# Patient Record
Sex: FEMALE | Race: WHITE | NOT HISPANIC OR LATINO | URBAN - METROPOLITAN AREA
[De-identification: names, ages, dates, MRNs, and addresses within clinical notes are randomized per-mention and may not be internally consistent; named-entity substitution may affect disease eponyms.]

---

## 2018-06-30 ENCOUNTER — EMERGENCY (EMERGENCY)
Facility: HOSPITAL | Age: 83
LOS: 1 days | Discharge: HOME | End: 2018-06-30
Attending: EMERGENCY MEDICINE | Admitting: EMERGENCY MEDICINE
Payer: MEDICARE

## 2018-06-30 VITALS
HEART RATE: 99 BPM | DIASTOLIC BLOOD PRESSURE: 88 MMHG | SYSTOLIC BLOOD PRESSURE: 185 MMHG | OXYGEN SATURATION: 96 % | RESPIRATION RATE: 18 BRPM | TEMPERATURE: 98 F

## 2018-06-30 DIAGNOSIS — Z96.649 PRESENCE OF UNSPECIFIED ARTIFICIAL HIP JOINT: Chronic | ICD-10-CM

## 2018-06-30 DIAGNOSIS — R04.0 EPISTAXIS: ICD-10-CM

## 2018-06-30 DIAGNOSIS — I25.10 ATHEROSCLEROTIC HEART DISEASE OF NATIVE CORONARY ARTERY WITHOUT ANGINA PECTORIS: ICD-10-CM

## 2018-06-30 PROCEDURE — 30903 CONTROL OF NOSEBLEED: CPT

## 2018-06-30 PROCEDURE — 99282 EMERGENCY DEPT VISIT SF MDM: CPT | Mod: 25

## 2018-06-30 NOTE — ED PROCEDURE NOTE - CPROC ED NASAL DETAIL1
The source of the bleeding was clearly identified./The area was cauterized with silver nitrate./The bleeding stopped. The source of the bleeding was clearly identified./The anatomic location was packed./The bleeding stopped./The area was cauterized with silver nitrate.

## 2018-06-30 NOTE — ED PROVIDER NOTE - MEDICAL DECISION MAKING DETAILS
Dried blood at medial nose, no active bleeding. Patient requesting cauterized. Will cauterized and monitor.

## 2018-06-30 NOTE — ED PROVIDER NOTE - OBJECTIVE STATEMENT
86 y/o F w/ PMH CAD, on Eliquis, BIB EMS from [facility] accompanied by son who presents w/ nosebleed to the R nare that began 2 hours ago. On arrival here, the nosebleed has resolved without recurrence. Son states she will follow up with her cardiologist tomorrow to have dose of Eliquis decreased from 5 mg to 2.5 mg, and states they believe this is the cause of the nose bleed. Son and patient are asking to cauterized the source of bleeding. Denies trauma/injury, fever, chills, chest pain, shortness of breath, vomiting, bleeding, or any other complaints.

## 2018-06-30 NOTE — ED ADULT NURSE NOTE - OBJECTIVE STATEMENT
patient comes in for Epistaxis, that has resolved. states newly on Eliquis and received patient with a nasal packing, done by Dr Hamilton, patient tolerated.

## 2018-07-01 ENCOUNTER — EMERGENCY (EMERGENCY)
Facility: HOSPITAL | Age: 83
LOS: 1 days | Discharge: HOME | End: 2018-07-01
Attending: EMERGENCY MEDICINE | Admitting: EMERGENCY MEDICINE
Payer: MEDICARE

## 2018-07-01 VITALS
TEMPERATURE: 99 F | SYSTOLIC BLOOD PRESSURE: 134 MMHG | RESPIRATION RATE: 18 BRPM | HEART RATE: 99 BPM | WEIGHT: 158.95 LBS | OXYGEN SATURATION: 97 % | DIASTOLIC BLOOD PRESSURE: 86 MMHG

## 2018-07-01 VITALS
OXYGEN SATURATION: 99 % | HEART RATE: 92 BPM | TEMPERATURE: 98 F | SYSTOLIC BLOOD PRESSURE: 110 MMHG | DIASTOLIC BLOOD PRESSURE: 72 MMHG | RESPIRATION RATE: 18 BRPM

## 2018-07-01 DIAGNOSIS — Z96.649 PRESENCE OF UNSPECIFIED ARTIFICIAL HIP JOINT: Chronic | ICD-10-CM

## 2018-07-01 DIAGNOSIS — Z98.890 OTHER SPECIFIED POSTPROCEDURAL STATES: Chronic | ICD-10-CM

## 2018-07-01 LAB
ANION GAP SERPL CALC-SCNC: 10 MMOL/L — SIGNIFICANT CHANGE UP (ref 5–17)
APTT BLD: 32 SEC — SIGNIFICANT CHANGE UP (ref 27.5–37.4)
BASOPHILS NFR BLD AUTO: 1.1 % — SIGNIFICANT CHANGE UP (ref 0–2)
BUN SERPL-MCNC: 45 MG/DL — HIGH (ref 7–23)
CALCIUM SERPL-MCNC: 9.1 MG/DL — SIGNIFICANT CHANGE UP (ref 8.4–10.5)
CHLORIDE SERPL-SCNC: 103 MMOL/L — SIGNIFICANT CHANGE UP (ref 96–108)
CO2 SERPL-SCNC: 30 MMOL/L — SIGNIFICANT CHANGE UP (ref 22–31)
CREAT SERPL-MCNC: 0.8 MG/DL — SIGNIFICANT CHANGE UP (ref 0.5–1.3)
EOSINOPHIL NFR BLD AUTO: 0.9 % — SIGNIFICANT CHANGE UP (ref 0–6)
GLUCOSE SERPL-MCNC: 103 MG/DL — HIGH (ref 70–99)
HCT VFR BLD CALC: 29.5 % — LOW (ref 34.5–45)
HGB BLD-MCNC: 9.1 G/DL — LOW (ref 11.5–15.5)
INR BLD: 1.5 — HIGH (ref 0.88–1.16)
LYMPHOCYTES # BLD AUTO: 14.2 % — SIGNIFICANT CHANGE UP (ref 13–44)
MCHC RBC-ENTMCNC: 30.8 G/DL — LOW (ref 32–36)
MCHC RBC-ENTMCNC: 31.9 PG — SIGNIFICANT CHANGE UP (ref 27–34)
MCV RBC AUTO: 103.5 FL — HIGH (ref 80–100)
MONOCYTES NFR BLD AUTO: 8 % — SIGNIFICANT CHANGE UP (ref 2–14)
NEUTROPHILS NFR BLD AUTO: 75.8 % — SIGNIFICANT CHANGE UP (ref 43–77)
PLATELET # BLD AUTO: 415 K/UL — HIGH (ref 150–400)
POTASSIUM SERPL-MCNC: 3.9 MMOL/L — SIGNIFICANT CHANGE UP (ref 3.5–5.3)
POTASSIUM SERPL-SCNC: 3.9 MMOL/L — SIGNIFICANT CHANGE UP (ref 3.5–5.3)
PROTHROM AB SERPL-ACNC: 16.8 SEC — HIGH (ref 9.8–12.7)
RBC # BLD: 2.85 M/UL — LOW (ref 3.8–5.2)
RBC # FLD: 14.9 % — SIGNIFICANT CHANGE UP (ref 10.3–16.9)
SODIUM SERPL-SCNC: 143 MMOL/L — SIGNIFICANT CHANGE UP (ref 135–145)
WBC # BLD: 15.9 K/UL — HIGH (ref 3.8–10.5)
WBC # FLD AUTO: 15.9 K/UL — HIGH (ref 3.8–10.5)

## 2018-07-01 PROCEDURE — 99284 EMERGENCY DEPT VISIT MOD MDM: CPT

## 2018-07-01 PROCEDURE — 80048 BASIC METABOLIC PNL TOTAL CA: CPT

## 2018-07-01 PROCEDURE — 85730 THROMBOPLASTIN TIME PARTIAL: CPT

## 2018-07-01 PROCEDURE — 85025 COMPLETE CBC W/AUTO DIFF WBC: CPT

## 2018-07-01 PROCEDURE — 85610 PROTHROMBIN TIME: CPT

## 2018-07-01 PROCEDURE — 36415 COLL VENOUS BLD VENIPUNCTURE: CPT

## 2018-07-01 RX ORDER — APIXABAN 2.5 MG/1
0 TABLET, FILM COATED ORAL
Qty: 0 | Refills: 0 | COMMUNITY

## 2018-07-01 RX ORDER — OXYMETAZOLINE HYDROCHLORIDE 0.5 MG/ML
2 SPRAY NASAL ONCE
Qty: 0 | Refills: 0 | Status: COMPLETED | OUTPATIENT
Start: 2018-07-01 | End: 2018-07-01

## 2018-07-01 RX ORDER — AMOXICILLIN 250 MG/5ML
1 SUSPENSION, RECONSTITUTED, ORAL (ML) ORAL
Qty: 30 | Refills: 0 | OUTPATIENT
Start: 2018-07-01 | End: 2018-07-10

## 2018-07-01 RX ORDER — OXYMETAZOLINE HYDROCHLORIDE 0.5 MG/ML
2 SPRAY NASAL
Qty: 1 | Refills: 0 | OUTPATIENT
Start: 2018-07-01 | End: 2018-07-05

## 2018-07-01 RX ORDER — AMOXICILLIN 250 MG/5ML
500 SUSPENSION, RECONSTITUTED, ORAL (ML) ORAL ONCE
Qty: 0 | Refills: 0 | Status: COMPLETED | OUTPATIENT
Start: 2018-07-01 | End: 2018-07-01

## 2018-07-01 RX ORDER — LIDOCAINE 4 G/100G
1 CREAM TOPICAL ONCE
Qty: 0 | Refills: 0 | Status: COMPLETED | OUTPATIENT
Start: 2018-07-01 | End: 2018-07-01

## 2018-07-01 RX ORDER — LIDOCAINE 4 G/100G
1 CREAM TOPICAL ONCE
Qty: 0 | Refills: 0 | Status: DISCONTINUED | OUTPATIENT
Start: 2018-07-01 | End: 2018-07-01

## 2018-07-01 RX ADMIN — Medication 500 MILLIGRAM(S): at 10:12

## 2018-07-01 RX ADMIN — LIDOCAINE 1 APPLICATION(S): 4 CREAM TOPICAL at 08:11

## 2018-07-01 RX ADMIN — OXYMETAZOLINE HYDROCHLORIDE 2 SPRAY(S): 0.5 SPRAY NASAL at 08:14

## 2018-07-01 NOTE — ED ADULT NURSE NOTE - CHPI ED SYMPTOMS NEG
no weakness/no syncope/no blurred vision/no numbness/no fever/no change in level of consciousness/no vomiting/no loss of consciousness/no nausea/no chills

## 2018-07-01 NOTE — ED PROVIDER NOTE - MEDICAL DECISION MAKING DETAILS
epistaxis. VSS. rhino rocket in place with bleeding around rhino rocket. labs noted. pt evaluated in ED by ENT and packing replaced. bleeding resolved. recommend amoxicillin, afrin and f/u ni 3 days for removal.

## 2018-07-01 NOTE — ED ADULT TRIAGE NOTE - CHIEF COMPLAINT QUOTE
[facility] [place] for nose bleed had rhino rocket placed to right nostril at [facility] yesterday told to return if bleeding again. Rhino rocket in tact with blood noted not dripped pt on eliquis

## 2018-07-01 NOTE — ED ADULT NURSE REASSESSMENT NOTE - NS ED NURSE REASSESS COMMENT FT1
pt in no acute distress. safety maintained. family member at bedside. pending ENT to evaluate the pt.

## 2018-07-01 NOTE — ED PROVIDER NOTE - ATTENDING CONTRIBUTION TO CARE
patient in ED with recurrent epistaxis.  Patient was seen at [facility] for epistaxis yesterday, packing was placed and patient discharged.  She presents to ED with chief complaint bleeding around packing this morning.  Upon my face to face ED eval of patient she has small amount of oozing blood around packing material to right nares.  + Blood to posterior oropharynx.  Heart with regular rate and rhythm, lungs CTA, bilaterally.  Abd soft.  ENT in ED to evaluate patient, will disposition accordingly.

## 2018-07-01 NOTE — ED PROVIDER NOTE - ENMT, MLM
Airway patent,+ rhino rocket in place to right nare. + clot present at anterior portion of rhino rocket. no blood in poster pharynx. Mouth with normal mucosa. Throat has no vesicles, no oropharyngeal exudates and uvula is midline.

## 2018-07-01 NOTE — ED ADULT NURSE NOTE - CHIEF COMPLAINT QUOTE
[facility] [place] [facility] bleed had rhino rocket placed to right nostril at [facility] yesterday told to return if bleeding again. Rhino rocket in tact with blood noted not dripped pt on eliquis

## 2018-07-01 NOTE — ED PROVIDER NOTE - OBJECTIVE STATEMENT
86 y/o female with hx of heart valve repair 1 yr ago on eloquis, HTN and thrombocytopenia c/o epistaxis. Pt states bleeding began last night at dinner and pt seen at [facility]. pt notes awoke this am with bleeding again. no trauma. no fever or chills. no ha or dizziness. no recent change in medications. no further complaints. 86 y/o female with hx of heart valve repair 1 yr ago on eloquis, HTN and thrombocytosis c/o epistaxis. Pt states bleeding began last night at dinner and pt seen at [facility] and [facility]. pt notes awoke this am with bleeding again. no trauma. no fever or chills. no ha or dizziness. no recent change in medications. no further complaints.

## 2018-07-01 NOTE — CONSULT NOTE ADULT - SUBJECTIVE AND OBJECTIVE BOX
HPI: 84 y/o female with hx of heart valve repair 1 yr ago on eloquis, HTN and thrombocytosis c/o epistaxis. Pt states bleeding began last night at dinner and pt seen at [facility] and rhino rocket placed. pt notes awoke this am with bleeding again. no trauma. no fever or chills. no ha or dizziness. no recent change in medications. no further complaints.    Allergies    No Known Allergies    Intolerances        PAST MEDICAL & SURGICAL HISTORY:  Thrombocytosis  HTN (hypertension)  Afib  S/P heart valve repair  History of hip replacement      SOCIAL HISTORY:  Tobacco History:  ETOH Use:   Drug Use:     FAMILY HISTORY:      REVIEW OF SYSTEMS    General:	    HEENT:	  -Neck:  -Ear:   -Mouth and Throat:  -Head/Face:  -Eyes:     Neurologic:  -Cranial Nerves:   	  Allergic/Immunologic:  Respiratory:  Cardiovascular:  Hematology/Lymphatics:	  Endocrine:	      MEDICATIONS:  Antiinfectives:       Hematologic/Anticoagulation:      Pain medications/Neuro:      IV fluids:      Endocrine Medications:       All other standing medications:       All other PRN medications:      Vital Signs Last 24 Hrs  T(C): 36.8 (01 Jul 2018 10:29), Max: 37 (01 Jul 2018 07:09)  T(F): 98.2 (01 Jul 2018 10:29), Max: 98.6 (01 Jul 2018 07:09)  HR: 92 (01 Jul 2018 10:29) (92 - 99)  BP: 110/72 (01 Jul 2018 10:29) (110/72 - 185/88)  BP(mean): --  RR: 18 (01 Jul 2018 10:29) (17 - 18)  SpO2: 99% (01 Jul 2018 10:29) (96% - 99%)    LABS:  CBC-    07-01    143  |  103  |  45<H>  ----------------------------<  103<H>  3.9   |  30  |  0.80    Ca    9.1      01 Jul 2018 07:34      Coagulation Studies-  PT/INR - ( 01 Jul 2018 07:34 )   PT: 16.8 sec;   INR: 1.50          PTT - ( 01 Jul 2018 07:34 )  PTT:32.0 sec  Endocrine Panel-  --  --  9.1 mg/dL        PHYSICAL EXAM:    ENT EXAM-   NAD, AO x3  Nose: oozing from the R nares with partial rhino rocket extruding, packing removed. copious clots suctioned out from the nasal cavity, afrin spray, generalized bleeding noted from the R septum, 8cm merocel placed, no further bleeding noted  OC/OP: posterior OP clear  Neck: supple      Nasal Endoscopy Findings:  -use additional template under ENT [facility] Nasal Endoscopy Exam    Laryngoscopy Findings:   -use additional template under ENT [facility] Laryngoscopy Exam    RADIOLOGY & ADDITIONAL STUDIES:      Assessment/Plan:  85y Female on Eliquis for cardiac issues presenting with R epistaxis  - R nasal cavity packed with merocel  - Antibiotics while packing in  - Afrin x3days for R nose  - sinonasal precautions advised  - pt told that packing to be removed in 4-5 days; pt to follow-up with ENT at [facility] for packing removal  - care/dispo as per ED  - call/page ENT for questions/concerns HPI: 84 y/o female with hx of heart valve repair 1 yr ago on eloquis, HTN and thrombocytosis c/o epistaxis. Pt states bleeding began last night at dinner and pt seen at [facility] and rhino rocket placed. pt notes awoke this am with bleeding again. no trauma. no fever or chills. no ha or dizziness. no recent change in medications. no further complaints.    Allergies    No Known Allergies    Intolerances        PAST MEDICAL & SURGICAL HISTORY:  Thrombocytosis  HTN (hypertension)  Afib  S/P heart valve repair  History of hip replacement      SOCIAL HISTORY:  Tobacco History:  ETOH Use:   Drug Use:     FAMILY HISTORY:      REVIEW OF SYSTEMS    General:	    HEENT:	  -Neck:  -Ear:   -Mouth and Throat:  -Head/Face:  -Eyes:     Neurologic:  -Cranial Nerves:   	  Allergic/Immunologic:  Respiratory:  Cardiovascular:  Hematology/Lymphatics:	  Endocrine:	      MEDICATIONS:  Antiinfectives:       Hematologic/Anticoagulation:      Pain medications/Neuro:      IV fluids:      Endocrine Medications:       All other standing medications:       All other PRN medications:      Vital Signs Last 24 Hrs  T(C): 36.8 (01 Jul 2018 10:29), Max: 37 (01 Jul 2018 07:09)  T(F): 98.2 (01 Jul 2018 10:29), Max: 98.6 (01 Jul 2018 07:09)  HR: 92 (01 Jul 2018 10:29) (92 - 99)  BP: 110/72 (01 Jul 2018 10:29) (110/72 - 185/88)  BP(mean): --  RR: 18 (01 Jul 2018 10:29) (17 - 18)  SpO2: 99% (01 Jul 2018 10:29) (96% - 99%)    LABS:  CBC-    07-01    143  |  103  |  45<H>  ----------------------------<  103<H>  3.9   |  30  |  0.80    Ca    9.1      01 Jul 2018 07:34      Coagulation Studies-  PT/INR - ( 01 Jul 2018 07:34 )   PT: 16.8 sec;   INR: 1.50          PTT - ( 01 Jul 2018 07:34 )  PTT:32.0 sec  Endocrine Panel-  --  --  9.1 mg/dL        PHYSICAL EXAM:    ENT EXAM-   NAD, AO x3  Nose: oozing from the R nares with partial rhino rocket extruding, packing removed. copious clots suctioned out from the nasal cavity, afrin spray, generalized bleeding noted from the R septum, 8cm merocel placed, no further bleeding noted  OC/OP: posterior OP clear  Neck: supple      Nasal Endoscopy Findings:  -use additional template under ENT [facility] Nasal Endoscopy Exam    Laryngoscopy Findings:   -use additional template under ENT [facility] Laryngoscopy Exam    RADIOLOGY & ADDITIONAL STUDIES:      Assessment/Plan:  85y Female on Eliquis for cardiac issues presenting with R epistaxis  - R nasal cavity packed with merocel  - Antibiotics while packing in  - Afrin x3days for R nose  - sinonasal precautions advised  - pt told that packing to be removed in 4-5 days; pt to follow-up with ENT at [facility] for packing removal  - care/dispo as per ED  - call/page ENT for questions/concerns    Communicated with Dr. Mckeon covering for Dr. Banks

## 2018-07-05 ENCOUNTER — EMERGENCY (EMERGENCY)
Facility: HOSPITAL | Age: 83
LOS: 1 days | Discharge: HOME | End: 2018-07-05
Attending: EMERGENCY MEDICINE | Admitting: EMERGENCY MEDICINE
Payer: MEDICARE

## 2018-07-05 VITALS
WEIGHT: 151.9 LBS | RESPIRATION RATE: 95 BRPM | HEART RATE: 92 BPM | SYSTOLIC BLOOD PRESSURE: 115 MMHG | TEMPERATURE: 98 F | DIASTOLIC BLOOD PRESSURE: 78 MMHG

## 2018-07-05 DIAGNOSIS — R04.0 EPISTAXIS: ICD-10-CM

## 2018-07-05 DIAGNOSIS — I10 ESSENTIAL (PRIMARY) HYPERTENSION: ICD-10-CM

## 2018-07-05 DIAGNOSIS — Z79.899 OTHER LONG TERM (CURRENT) DRUG THERAPY: ICD-10-CM

## 2018-07-05 DIAGNOSIS — Z98.890 OTHER SPECIFIED POSTPROCEDURAL STATES: Chronic | ICD-10-CM

## 2018-07-05 DIAGNOSIS — Z79.02 LONG TERM (CURRENT) USE OF ANTITHROMBOTICS/ANTIPLATELETS: ICD-10-CM

## 2018-07-05 DIAGNOSIS — Z96.649 PRESENCE OF UNSPECIFIED ARTIFICIAL HIP JOINT: Chronic | ICD-10-CM

## 2018-07-05 DIAGNOSIS — Z48.00 ENCOUNTER FOR CHANGE OR REMOVAL OF NONSURGICAL WOUND DRESSING: ICD-10-CM

## 2018-07-05 DIAGNOSIS — Z79.2 LONG TERM (CURRENT) USE OF ANTIBIOTICS: ICD-10-CM

## 2018-07-05 PROCEDURE — 99284 EMERGENCY DEPT VISIT MOD MDM: CPT | Mod: 25

## 2018-07-05 PROCEDURE — 99282 EMERGENCY DEPT VISIT SF MDM: CPT

## 2018-07-05 NOTE — CONSULT NOTE ADULT - SUBJECTIVE AND OBJECTIVE BOX
ENT Consult Note    HPI: 85F hx AFib on eliquis in ED for packing removal. Patient was last seen 7/1 in the ED for epistaxis, 8inch merocel placed at that time with instructions to f/u as outpt in 4-5 days for packing removal. She has had occasional mucous dripping from anterior nares but has not had any active bleeding or oozing. Per patient, they were unable to get an appointment as an outpatient so returned to the ED for packing removal.       PE  Alert, NAD  Nares: Merocel in R naris, taped to cheek, no dripping or oozing, no active bleeding. L naris wnl  OC/OPx: Clear, no oozing or active bleeding on OPx  Nonlabored respirations RA  NAVARRO    Merocel moistened with normal saline, gently removed from R naris. Patient tolerated well, no active bleeding. Minor mucous/old blood dripping from naris, expected.       A/P: 85F AFib on eliquis with epistaxis 7/1, now s/p packing removal.  - Avoid digital trauma/instrumentation in nares  - Nasal saline spray BID  - Bacitracin to anterior nares QHS x10d  - F/u Dr. Lauren 1 week

## 2018-07-05 NOTE — ED PROVIDER NOTE - MEDICAL DECISION MAKING DETAILS
86 y/o female with right nasal packing placed by ENT on July 1st and started on abx for continued epistaxis. Pt was advised to fu at the clinic however was unable to obtain appointment. Denies epistaxis, HA, discharge, fever, chills. ENT consulted - removed. Pt was dc'ed by ENT, however left prior to documents.

## 2018-07-05 NOTE — ED PROVIDER NOTE - OBJECTIVE STATEMENT
84 y/o female with a PMHx of Afib (Eliquis) is present for packing removal in her right nostril. Pt states she had spontaneous bleeding 4 days ago and was placed on abx and advised to follow up at the clinical for removal, however was unable to obtain an appointment. Pt reports no nasal pain and states only a small amount of clear fluid that dripped from her nose. She also denies: fever, chills, HA, dizziness, epistaxis.

## 2019-08-30 PROBLEM — I10 ESSENTIAL (PRIMARY) HYPERTENSION: Chronic | Status: ACTIVE | Noted: 2018-07-01

## 2019-08-30 PROBLEM — D47.3 ESSENTIAL (HEMORRHAGIC) THROMBOCYTHEMIA: Chronic | Status: ACTIVE | Noted: 2018-07-01

## 2019-08-30 PROBLEM — I48.91 UNSPECIFIED ATRIAL FIBRILLATION: Chronic | Status: ACTIVE | Noted: 2018-07-01

## 2019-09-27 ENCOUNTER — APPOINTMENT (OUTPATIENT)
Dept: INTERNAL MEDICINE | Facility: CLINIC | Age: 84
End: 2019-09-27

## 2020-01-15 ENCOUNTER — LABORATORY RESULT (OUTPATIENT)
Age: 85
End: 2020-01-15

## 2020-01-15 ENCOUNTER — APPOINTMENT (OUTPATIENT)
Dept: INTERNAL MEDICINE | Facility: CLINIC | Age: 85
End: 2020-01-15
Payer: MEDICARE

## 2020-01-15 VITALS
HEART RATE: 83 BPM | SYSTOLIC BLOOD PRESSURE: 179 MMHG | HEIGHT: 61.5 IN | DIASTOLIC BLOOD PRESSURE: 90 MMHG | WEIGHT: 134 LBS | BODY MASS INDEX: 24.98 KG/M2

## 2020-01-15 DIAGNOSIS — Z87.74 PERSONAL HISTORY OF (CORRECTED) CONGENITAL MALFORMATIONS OF HEART AND CIRCULATORY SYSTEM: ICD-10-CM

## 2020-01-15 DIAGNOSIS — I48.91 UNSPECIFIED ATRIAL FIBRILLATION: ICD-10-CM

## 2020-01-15 DIAGNOSIS — E55.9 VITAMIN D DEFICIENCY, UNSPECIFIED: ICD-10-CM

## 2020-01-15 DIAGNOSIS — Z78.9 OTHER SPECIFIED HEALTH STATUS: ICD-10-CM

## 2020-01-15 DIAGNOSIS — I05.9 RHEUMATIC MITRAL VALVE DISEASE, UNSPECIFIED: ICD-10-CM

## 2020-01-15 DIAGNOSIS — E53.8 DEFICIENCY OF OTHER SPECIFIED B GROUP VITAMINS: ICD-10-CM

## 2020-01-15 DIAGNOSIS — D47.3 ESSENTIAL (HEMORRHAGIC) THROMBOCYTHEMIA: ICD-10-CM

## 2020-01-15 DIAGNOSIS — R60.0 LOCALIZED EDEMA: ICD-10-CM

## 2020-01-15 DIAGNOSIS — Z00.00 ENCOUNTER FOR GENERAL ADULT MEDICAL EXAMINATION W/OUT ABNORMAL FINDINGS: ICD-10-CM

## 2020-01-15 DIAGNOSIS — Z98.890 OTHER SPECIFIED POSTPROCEDURAL STATES: ICD-10-CM

## 2020-01-15 DIAGNOSIS — E03.9 HYPOTHYROIDISM, UNSPECIFIED: ICD-10-CM

## 2020-01-15 DIAGNOSIS — S72.001A FRACTURE OF UNSPECIFIED PART OF NECK OF RIGHT FEMUR, INITIAL ENCOUNTER FOR CLOSED FRACTURE: ICD-10-CM

## 2020-01-15 DIAGNOSIS — I10 ESSENTIAL (PRIMARY) HYPERTENSION: ICD-10-CM

## 2020-01-15 DIAGNOSIS — E78.5 HYPERLIPIDEMIA, UNSPECIFIED: ICD-10-CM

## 2020-01-15 DIAGNOSIS — L88 PYODERMA GANGRENOSUM: ICD-10-CM

## 2020-01-15 PROCEDURE — 36415 COLL VENOUS BLD VENIPUNCTURE: CPT

## 2020-01-15 PROCEDURE — G0439: CPT

## 2020-01-15 RX ORDER — LEVOTHYROXINE SODIUM 0.17 MG/1
TABLET ORAL
Refills: 0 | Status: ACTIVE | COMMUNITY

## 2020-01-15 RX ORDER — HYDROXYUREA 500 MG/1
500 CAPSULE ORAL DAILY
Refills: 0 | Status: ACTIVE | COMMUNITY

## 2020-01-15 RX ORDER — METOPROLOL SUCCINATE 50 MG/1
50 TABLET, EXTENDED RELEASE ORAL DAILY
Refills: 0 | Status: ACTIVE | COMMUNITY

## 2020-01-15 RX ORDER — LOSARTAN POTASSIUM 50 MG/1
50 TABLET, FILM COATED ORAL DAILY
Refills: 0 | Status: ACTIVE | COMMUNITY

## 2020-01-15 RX ORDER — ATORVASTATIN CALCIUM 10 MG/1
10 TABLET, FILM COATED ORAL DAILY
Refills: 0 | Status: ACTIVE | COMMUNITY

## 2020-01-15 RX ORDER — APIXABAN 2.5 MG/1
2.5 TABLET, FILM COATED ORAL
Refills: 0 | Status: ACTIVE | COMMUNITY

## 2020-01-15 RX ORDER — FUROSEMIDE 40 MG/1
40 TABLET ORAL
Refills: 0 | Status: ACTIVE | COMMUNITY

## 2020-01-15 NOTE — HISTORY OF PRESENT ILLNESS
[de-identified] : patient known to me from previous practice \par 87 y/o female with h/o htn, thrombocythemia, afib on eliquis presents with son for follow-up.\par \par of note, patient recently had revision of right hip due to fall last year.  completed PT and is walking well with rolling walker.  no pain \par

## 2020-01-15 NOTE — PHYSICAL EXAM
[Normal Rate] : normal rate  [Normal] : no respiratory distress, lungs were clear to auscultation bilaterally and no accessory muscle use [Regular Rhythm] : with a regular rhythm [Pedal Pulses Present] : the pedal pulses are present [No Edema] : there was no peripheral edema [Soft] : abdomen soft [Non Tender] : non-tender [Non-distended] : non-distended [Normal Posterior Cervical Nodes] : no posterior cervical lymphadenopathy [Normal Anterior Cervical Nodes] : no anterior cervical lymphadenopathy [No Spinal Tenderness] : no spinal tenderness [No CVA Tenderness] : no CVA  tenderness [No Rash] : no rash [Coordination Grossly Intact] : coordination grossly intact [Normal Affect] : the affect was normal [Normal Mood] : the mood was normal [de-identified] : irregular rhythm.  +systolic murmur [de-identified] : seborrheic keratosis

## 2020-01-15 NOTE — HEALTH RISK ASSESSMENT
[Very Good] : ~his/her~ current health as very good [] : No [Good] : ~his/her~  mood as  good [No] : No [de-identified] : walks with rolling walker  [de-identified] : home cooking mostly  [Change in mental status noted] : No change in mental status noted [Transportation] : transportation [With Family] : lives with family [Retired] : retired [] :  [Independent] : using telephone [Some assistance needed] : managing finances [Full assistance needed] : using transportation [Reports changes in hearing] : Reports no changes in hearing [Reports changes in vision] : Reports no changes in vision [Reports changes in dental health] : Reports no changes in dental health

## 2020-01-15 NOTE — ASSESSMENT
[FreeTextEntry1] : \par HTN, recently added losartan by cardio.  controlled today \par -cont meds as directed \par -will check renal fx today \par \par LE edema, stable.  no swelling now \par -cont lasix QOD\par \par Afib, rate controlled.  on eliquis \par -cont meds \par -f/u as per cardio \par \par thrombocytosis on hydroxyurea.  plts  ct has gloria stable \par -check cbc \par -f/u with hem/onco as directed \par \par Annual \par -Advised to get yearly Flu shot -refused\par -Advised Yearly Eye exam with Ophthalmologist\par -Advised Yearly Dental exam\par -Educated of the importance of Healthy diet, such as Mediterranean Diet and Exercise, such as walking >20 minutes a day and increasing gradually as tolerated\par \par -prevnar -refused\par -pneumovax -refused\par \par has appt with cardio next month, Dr. Aparicio ([facility])\par f/u in 6 months \par \par \par \par \par \par \par \par

## 2020-01-17 LAB
25(OH)D3 SERPL-MCNC: 17.7 NG/ML
ALBUMIN SERPL ELPH-MCNC: 4.6 G/DL
ALP BLD-CCNC: 84 U/L
ALT SERPL-CCNC: 11 U/L
ANION GAP SERPL CALC-SCNC: 12 MMOL/L
AST SERPL-CCNC: 27 U/L
BASOPHILS # BLD AUTO: 0.07 K/UL
BASOPHILS NFR BLD AUTO: 1.1 %
BILIRUB DIRECT SERPL-MCNC: 0.5 MG/DL
BILIRUB INDIRECT SERPL-MCNC: 1.1 MG/DL
BILIRUB SERPL-MCNC: 1.6 MG/DL
BUN SERPL-MCNC: 22 MG/DL
CALCIUM SERPL-MCNC: 10 MG/DL
CHLORIDE SERPL-SCNC: 101 MMOL/L
CHOLEST SERPL-MCNC: 105 MG/DL
CHOLEST/HDLC SERPL: 1.8 RATIO
CO2 SERPL-SCNC: 26 MMOL/L
CREAT SERPL-MCNC: 1 MG/DL
EOSINOPHIL # BLD AUTO: 0.1 K/UL
EOSINOPHIL NFR BLD AUTO: 1.6 %
ERYTHROCYTE [SEDIMENTATION RATE] IN BLOOD BY WESTERGREN METHOD: 15 MM/HR
ESTIMATED AVERAGE GLUCOSE: 97 MG/DL
FERRITIN SERPL-MCNC: 89 NG/ML
FOLATE SERPL-MCNC: >20 NG/ML
GLUCOSE SERPL-MCNC: 91 MG/DL
HBA1C MFR BLD HPLC: 5 %
HCT VFR BLD CALC: 38.5 %
HDLC SERPL-MCNC: 60 MG/DL
HGB BLD-MCNC: 12 G/DL
IMM GRANULOCYTES NFR BLD AUTO: 0.5 %
IRON SATN MFR SERPL: 22 %
IRON SERPL-MCNC: 61 UG/DL
LDLC SERPL CALC-MCNC: 34 MG/DL
LYMPHOCYTES # BLD AUTO: 0.85 K/UL
LYMPHOCYTES NFR BLD AUTO: 13.5 %
MAN DIFF?: NORMAL
MCHC RBC-ENTMCNC: 31.2 GM/DL
MCHC RBC-ENTMCNC: 33.6 PG
MCV RBC AUTO: 107.8 FL
MONOCYTES # BLD AUTO: 0.39 K/UL
MONOCYTES NFR BLD AUTO: 6.2 %
NEUTROPHILS # BLD AUTO: 4.87 K/UL
NEUTROPHILS NFR BLD AUTO: 77.1 %
PLATELET # BLD AUTO: 433 K/UL
POTASSIUM SERPL-SCNC: 4.4 MMOL/L
PROT SERPL-MCNC: 6.8 G/DL
RBC # BLD: 3.57 M/UL
RBC # FLD: 13.7 %
SAVE SPECIMEN: NORMAL
SODIUM SERPL-SCNC: 138 MMOL/L
TIBC SERPL-MCNC: 278 UG/DL
TRIGL SERPL-MCNC: 61 MG/DL
TSH SERPL-ACNC: 4.98 UIU/ML
UIBC SERPL-MCNC: 218 UG/DL
URATE SERPL-MCNC: 4.7 MG/DL
VIT B12 SERPL-MCNC: 467 PG/ML
WBC # FLD AUTO: 6.31 K/UL

## 2020-07-15 ENCOUNTER — APPOINTMENT (OUTPATIENT)
Dept: INTERNAL MEDICINE | Facility: CLINIC | Age: 85
End: 2020-07-15

## 2021-10-06 PROBLEM — I10 ESSENTIAL HYPERTENSION: Status: ACTIVE | Noted: 2020-01-15
